# Patient Record
Sex: FEMALE | HISPANIC OR LATINO
[De-identification: names, ages, dates, MRNs, and addresses within clinical notes are randomized per-mention and may not be internally consistent; named-entity substitution may affect disease eponyms.]

---

## 2022-07-07 PROBLEM — Z00.00 ENCOUNTER FOR PREVENTIVE HEALTH EXAMINATION: Status: ACTIVE | Noted: 2022-07-07

## 2022-07-18 ENCOUNTER — APPOINTMENT (OUTPATIENT)
Dept: RHEUMATOLOGY | Facility: CLINIC | Age: 52
End: 2022-07-18

## 2022-07-18 VITALS
DIASTOLIC BLOOD PRESSURE: 70 MMHG | WEIGHT: 230 LBS | OXYGEN SATURATION: 98 % | BODY MASS INDEX: 36.1 KG/M2 | HEIGHT: 67 IN | TEMPERATURE: 97.5 F | HEART RATE: 77 BPM | SYSTOLIC BLOOD PRESSURE: 106 MMHG

## 2022-07-18 DIAGNOSIS — Z82.49 FAMILY HISTORY OF ISCHEMIC HEART DISEASE AND OTHER DISEASES OF THE CIRCULATORY SYSTEM: ICD-10-CM

## 2022-07-18 DIAGNOSIS — Z80.6 FAMILY HISTORY OF LEUKEMIA: ICD-10-CM

## 2022-07-18 DIAGNOSIS — Z80.49 FAMILY HISTORY OF MALIGNANT NEOPLASM OF OTHER GENITAL ORGANS: ICD-10-CM

## 2022-07-18 DIAGNOSIS — Z78.9 OTHER SPECIFIED HEALTH STATUS: ICD-10-CM

## 2022-07-18 DIAGNOSIS — M19.90 UNSPECIFIED OSTEOARTHRITIS, UNSPECIFIED SITE: ICD-10-CM

## 2022-07-18 DIAGNOSIS — R76.8 OTHER SPECIFIED ABNORMAL IMMUNOLOGICAL FINDINGS IN SERUM: ICD-10-CM

## 2022-07-18 DIAGNOSIS — M25.50 PAIN IN UNSPECIFIED JOINT: ICD-10-CM

## 2022-07-18 PROCEDURE — 36415 COLL VENOUS BLD VENIPUNCTURE: CPT

## 2022-07-18 PROCEDURE — 99203 OFFICE O/P NEW LOW 30 MIN: CPT | Mod: 25

## 2022-07-18 NOTE — HISTORY OF PRESENT ILLNESS
[FreeTextEntry1] : 51-year-old woman referred for rheumatology evaluation\par Patient recently seen by primary care provider\par Noted to have positive JORDY, previously positive in the past as well\par Most recently, had a reaction to the sun a couple of weeks ago\par After sitting in sun, developed skin rash on the top of the thigh,  looked like livedo reticularis mid thigh to knee, one time only, well demarcated by a line wear shorts ended\par Patient concerned about possible photosensitivity as a symptom of positive JORDY\par Had repeat JORDY with higher titer than previous\par \par Also reports joint pain\par Primarily in the hips, knees, ankles (little bit), and back\par 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\par Pain occurs when changes from sitting to moving, moving makes it better\par Sitting in car makes it worse\par Can get moving, feels stiff and sore, takes a couple of minutes to get moving\par Hard to get up and down stairs\par No joint swelling\par No muscle weakness\par Does not exercise but walks the dog\par Previously had symptomatic heel spur for years, but has not bothered for several months\par \par Labs reviewed on patient's phone:\par JORDY positive\par Dsdna neg\par RNP neg\par Centromere pattern 1:320\par smith neg\par sjogrens neg\par lyme neg\par CMP normal\par CBC normal\par ESR 21\par \par No Raynaud's\par No GERD\par No dysphagia\par \par No regular medications\par Sporadic vitamins and supplements\par Had shoulder repair in the past on the right side, followed by adhesive capsulitis on the left side\par Takes tumeric, vitamin C, probably recently has been taking but not on regular basis\par No allergies to medications\par Does not use sunscreen\par No rash at this time\par No sicca symptoms\par No alopecia\par No family history of rheumatoid arthritis or lupus\par No history of psoriasis

## 2022-07-18 NOTE — DATA REVIEWED
[FreeTextEntry1] : JORDY positive\par Dsdna neg\par RNP neg\par Centromere pattern 1:320\par smith neg\par sjogrens neg\par lyme neg\par CMP normal\par CBC normal\par ESR 21

## 2022-07-18 NOTE — ASSESSMENT
[FreeTextEntry1] : 51-year-old woman referred for rheumatology evaluation.  Patient known to have a positive JORDY in the past although recent repeat with elevation in titer and referred to rheumatology for further evaluation.  Patient also recently with photosensitive rash after sitting in the sun, happened on one occasion and resolved on own.  At this time, patient with arthralgias predominantly of the lower extremities bilaterally with little involvement of the upper extremities, neuralgias not associated with morning stiffness or joint swelling.. At this time, patient does not meet criteria for an underlying connective tissue disease, however, may have developed a photosensitivity encourage sunscreen use and avoidance of prolonged periods of direct sunlight, given arthralgias will also check rheumatoid factor and anti-CCP although symptoms most likely secondary to early degenerative changes. Further management pending test results

## 2022-07-18 NOTE — PHYSICAL EXAM
[General Appearance - Alert] : alert [General Appearance - In No Acute Distress] : in no acute distress [General Appearance - Well Nourished] : well nourished [General Appearance - Well Developed] : well developed [General Appearance - Well-Appearing] : healthy appearing [Sclera] : the sclera and conjunctiva were normal [Examination Of The Oral Cavity] : the lips and gums were normal [Oropharynx] : the oropharynx was normal [Respiration, Rhythm And Depth] : normal respiratory rhythm and effort [Exaggerated Use Of Accessory Muscles For Inspiration] : no accessory muscle use [Abnormal Walk] : normal gait [Nail Clubbing] : no clubbing  or cyanosis of the fingernails [Musculoskeletal - Swelling] : no joint swelling seen [Motor Tone] : muscle strength and tone were normal [] : no rash [Skin Lesions] : no skin lesions [Oriented To Time, Place, And Person] : oriented to person, place, and time [Impaired Insight] : insight and judgment were intact [Affect] : the affect was normal [FreeTextEntry1] : No active synovitis of the upper and lower extremities bilaterally.  Miniaml decreas in ROM of the bilateral shoulders.

## 2022-07-19 LAB
CCP AB SER IA-ACNC: <8 UNITS
RF+CCP IGG SER-IMP: NEGATIVE
RHEUMATOID FACT SER QL: <10 IU/ML